# Patient Record
Sex: FEMALE | Race: WHITE
[De-identification: names, ages, dates, MRNs, and addresses within clinical notes are randomized per-mention and may not be internally consistent; named-entity substitution may affect disease eponyms.]

---

## 2017-07-08 ENCOUNTER — HEALTH MAINTENANCE LETTER (OUTPATIENT)
Age: 32
End: 2017-07-08

## 2017-07-29 ENCOUNTER — RADIANT APPOINTMENT (OUTPATIENT)
Dept: GENERAL RADIOLOGY | Facility: CLINIC | Age: 32
End: 2017-07-29
Attending: PHYSICIAN ASSISTANT
Payer: COMMERCIAL

## 2017-07-29 ENCOUNTER — OFFICE VISIT (OUTPATIENT)
Dept: URGENT CARE | Facility: URGENT CARE | Age: 32
End: 2017-07-29
Payer: COMMERCIAL

## 2017-07-29 VITALS
SYSTOLIC BLOOD PRESSURE: 120 MMHG | TEMPERATURE: 98.6 F | RESPIRATION RATE: 12 BRPM | BODY MASS INDEX: 21.57 KG/M2 | HEART RATE: 80 BPM | WEIGHT: 145 LBS | OXYGEN SATURATION: 98 % | DIASTOLIC BLOOD PRESSURE: 84 MMHG

## 2017-07-29 DIAGNOSIS — S69.91XA FINGER INJURY, RIGHT, INITIAL ENCOUNTER: ICD-10-CM

## 2017-07-29 DIAGNOSIS — S62.639B OPEN FRACTURE OF TUFT OF DISTAL PHALANX OF FINGER, INITIAL ENCOUNTER: Primary | ICD-10-CM

## 2017-07-29 PROCEDURE — 73140 X-RAY EXAM OF FINGER(S): CPT | Mod: RT

## 2017-07-29 PROCEDURE — 99214 OFFICE O/P EST MOD 30 MIN: CPT | Performed by: PHYSICIAN ASSISTANT

## 2017-07-29 RX ORDER — HYDROCODONE BITARTRATE AND ACETAMINOPHEN 5; 325 MG/1; MG/1
1-2 TABLET ORAL EVERY 6 HOURS PRN
Qty: 20 TABLET | Refills: 0 | Status: SHIPPED | OUTPATIENT
Start: 2017-07-29

## 2017-07-29 NOTE — NURSING NOTE
"Chief Complaint   Patient presents with     Urgent Care     Finger     smashed ring finger on her right hand about a week ago and has developed what seemed likes a infection now.  Finger is very painful too.     Initial /84 (BP Location: Right arm, Patient Position: Sitting, Cuff Size: Adult Regular)  Pulse 80  Temp 98.6  F (37  C) (Oral)  Resp 12  Wt 145 lb (65.8 kg)  SpO2 98%  BMI 21.57 kg/m2 Estimated body mass index is 21.57 kg/(m^2) as calculated from the following:    Height as of 11/3/16: 5' 8.75\" (1.746 m).    Weight as of this encounter: 145 lb (65.8 kg)..  BP completed using cuff size: regular  Mary Lan R.N.    "

## 2017-07-29 NOTE — MR AVS SNAPSHOT
After Visit Summary   7/29/2017    Ana María Nguyen    MRN: [id number]           Patient Information     Date Of Birth          1985        Visit Information        Provider Department      7/29/2017 12:50 PM Maricruz Yadav PA-C [facility]        Today's Diagnoses     Open fracture of tuft of distal phalanx of finger, initial encounter    -  1    Finger injury, right, initial encounter           Follow-ups after your visit        Who to contact     If you have questions or need follow up information about today's clinic visit or your schedule please contact [facility] directly at [phone].  Normal or non-critical lab and imaging results will be communicated to you by [facility], letter or phone within 4 business days after the clinic has received the results. If you do not hear from us within 7 days, please contact the clinic through [facility] or phone. If you have a critical or abnormal lab result, we will notify you by phone as soon as possible.  Submit refill requests through [employer] or call your pharmacy and they will forward the refill request to us. Please allow 3 business days for your refill to be completed.          Additional Information About Your Visit        MyChart Information     [employer] gives you secure access to your electronic health record. If you see a primary care provider, you can also send messages to your care team and make appointments. If you have questions, please call your primary care clinic.  If you do not have a primary care provider, please call [phone] and they will assist you.        Care EveryWhere ID     This is your Care EveryWhere ID. This could be used by other organizations to access your [facility] [place] medical records  [id number]        Your Vitals Were     Pulse Temperature Respirations Pulse Oximetry BMI (Body Mass Index)       80 98.6  F (37  C) (Oral) 12 98% 21.57 kg/m2        Blood Pressure from Last 3  Encounters:   07/29/17 120/84   11/03/16 120/80   12/26/14 108/70    Weight from Last 3 Encounters:   07/29/17 145 lb (65.8 kg)   11/03/16 149 lb 2 oz (67.6 kg)   12/26/14 147 lb 6.4 oz (66.9 kg)                 Today's Medication Changes          These changes are accurate as of: 7/29/17  7:11 PM.  If you have any questions, ask your nurse or doctor.               Start taking these medicines.        Dose/Directions    amoxicillin-clavulanate 875-125 MG per tablet   Commonly known as:  AUGMENTIN   Used for:  Open fracture of tuft of distal phalanx of finger, initial encounter   Started by:  Maricruz Yadav PA-C        Dose:  1 tablet   Take 1 tablet by mouth 2 times daily for 7 days   Quantity:  14 tablet   Refills:  0       HYDROcodone-acetaminophen 5-325 MG per tablet   Commonly known as:  NORCO   Used for:  Open fracture of tuft of distal phalanx of finger, initial encounter   Started by:  Maricruz Yadav PA-C        Dose:  1-2 tablet   Take 1-2 tablets by mouth every 6 hours as needed for moderate to severe pain   Quantity:  20 tablet   Refills:  0         Stop taking these medicines if you haven't already. Please contact your care team if you have questions.     fluticasone 50 MCG/ACT spray   Commonly known as:  FLONASE   Stopped by:  Maricruz Yadav PA-C                Where to get your medicines      These medications were sent to [id number] - [place] - [address]  AT [address] [place] [address]     Phone:  [phone]     amoxicillin-clavulanate 875-125 MG per tablet         Some of these will need a paper prescription and others can be bought over the counter.  Ask your nurse if you have questions.     Bring a paper prescription for each of these medications     HYDROcodone-acetaminophen 5-325 MG per tablet                Primary Care Provider Office Phone # Fax #    Katie Burrows [phone]  [phone]       [facility] [address] [place] [address]        Equal Access to Services     MANJIT FRANKEL : Hadii linda ramírez kayden Sojohn, wasallyda luqadaha, qaybta kaalmada howradrome, herbert austen jeredcristian lawrenceyarelis kadetegan kendy rowe. So [facility] [phone].    ATENCIÓN: Si habla español, tiene a escalante disposición servicios gratuitos de asistencia lingüística. Llame al [phone].    We comply with applicable federal civil rights laws and [place] laws. We do not discriminate on the basis of race, color, national origin, age, disability sex, sexual orientation or gender identity.            Thank you!     Thank you for choosing [facility]  for your care. Our goal is always to provide you with excellent care. Hearing back from our patients is one way we can continue to improve our services. Please take a few minutes to complete the written survey that you may receive in the mail after your visit with us. Thank you!             Your Updated Medication List - Protect others around you: Learn how to safely use, store and throw away your medicines at www.disposemymeds.org.          This list is accurate as of: 7/29/17  7:11 PM.  Always use your most recent med list.                   Brand Name Dispense Instructions for use Diagnosis    amoxicillin-clavulanate 875-125 MG per tablet    AUGMENTIN    14 tablet    Take 1 tablet by mouth 2 times daily for 7 days    Open fracture of tuft of distal phalanx of finger, initial encounter       HYDROcodone-acetaminophen 5-325 MG per tablet    NORCO    20 tablet    Take 1-2 tablets by mouth every 6 hours as needed for moderate to severe pain    Open fracture of tuft of distal phalanx of finger, initial encounter       ORSYTHIA 0.1-20 MG-MCG per tablet   Generic drug:  levonorgestrel-ethinyl estradiol      Take 1 tablet by mouth daily

## 2017-07-30 NOTE — PROGRESS NOTES
SUBJECTIVE:  Chief Complaint   Patient presents with     Urgent Care     Finger     smashed ring finger on her right hand about a week ago and has developed what seemed likes a infection now.  Finger is very painful too.     Ana María Nguyen is a 31 year old female presents with a chief complaint of right finger  fourth pain, swelling, tenderness and back of nail raised up.  Red around area.  .  The injury occurred 1 week(s) ago.   [place] How: tired to open one of the windows on the boat and it slammed down landing on her finger immediate pain, immediate swelling, was able to bear weight directly after injury, deformity was immediately noted, back of nail seems to be raised up.  .  The patient complained of moderate pain  and has not had decreased ROM.  Pain exacerbated by any pressure on the tip of finger.  Relieved by rest.  She treated it initially with ice and wrapped at times. This is the first time this type of injury has occurred to this patient.     Past Medical History:   Diagnosis Date     Allergic rhinitis, cause unspecified     allergy shots     ASCUS on Pap smear 5/21/10     Current Outpatient Prescriptions   Medication Sig Dispense Refill     amoxicillin-clavulanate (AUGMENTIN) 875-125 MG per tablet Take 1 tablet by mouth 2 times daily for 7 days 14 tablet 0     HYDROcodone-acetaminophen (NORCO) 5-325 MG per tablet Take 1-2 tablets by mouth every 6 hours as needed for moderate to severe pain 20 tablet 0     levonorgestrel-ethinyl estradiol (ORSYTHIA) 0.1-20 MG-MCG per tablet Take 1 tablet by mouth daily       Social History   Substance Use Topics     Smoking status: Never Smoker     Smokeless tobacco: Never Used     Alcohol use Yes      Comment: 3-4drinks at 1-2days per week on [facility]       ROS:  Review of systems negative except as stated above.    EXAM:   /84 (BP Location: Right arm, Patient Position: Sitting, Cuff Size: Adult Regular)  Pulse  80  Temp 98.6  F (37  C) (Oral)  Resp 12  Wt 145 lb (65.8 kg)  SpO2 98%  BMI 21.57 kg/m2  Gen: healthy,alert,no distress  Extremity: finger  fourth has mild swelling over distal finger.  Nail intact but proximal nail at skin level and can slightly lift..  Mild surrounding erythema.  No drainage or abscess noted.  FROM and tendon function intat.   There is not compromise to the distal circulation.  Pulses are +2 and CRT is brisk  GENERAL APPEARANCE: healthy, alert and no distress  EXTREMITIES: peripheral pulses normal  SKIN: no suspicious lesions or rashes.  Skin intact  NEURO: Normal strength and tone, sensory exam grossly normal, mentation intact and speech normal    X-RAY was done. -  Comminuted fracture at tuft of finger.      assessment/plan:  (M44.474C) Open fracture of tuft of distal phalanx of finger, initial encounter  (primary encounter diagnosis)  Comment: right 4th  Plan: amoxicillin-clavulanate (AUGMENTIN) 875-125 MG         per tablet, HYDROcodone-acetaminophen (NORCO)         5-325 MG per tablet         TD current.  Finger cleaned.  Finger splint applied and placed on Augmentin.  FU in 1 week to ensure healing.  Will likely take several weeks to heal fully.  Watch for signs of infection.  Likely to lose nail.   Call with concerns       (B78.71XA) Finger injury, right, initial encounter  Comment:   Plan: XR Finger Right G/E 2 Views

## 2018-07-14 ENCOUNTER — HEALTH MAINTENANCE LETTER (OUTPATIENT)
Age: 33
End: 2018-07-14

## 2019-12-09 ENCOUNTER — HEALTH MAINTENANCE LETTER (OUTPATIENT)
Age: 34
End: 2019-12-09

## 2020-03-15 ENCOUNTER — HEALTH MAINTENANCE LETTER (OUTPATIENT)
Age: 35
End: 2020-03-15

## 2021-01-14 ENCOUNTER — HEALTH MAINTENANCE LETTER (OUTPATIENT)
Age: 36
End: 2021-01-14

## 2021-06-24 ENCOUNTER — WALK IN (OUTPATIENT)
Dept: URGENT CARE | Age: 36
End: 2021-06-24

## 2021-06-24 VITALS
OXYGEN SATURATION: 100 % | BODY MASS INDEX: 22.22 KG/M2 | DIASTOLIC BLOOD PRESSURE: 88 MMHG | WEIGHT: 150 LBS | HEIGHT: 69 IN | HEART RATE: 88 BPM | TEMPERATURE: 97.4 F | SYSTOLIC BLOOD PRESSURE: 132 MMHG

## 2021-06-24 DIAGNOSIS — Z48.02 VISIT FOR SUTURE REMOVAL: Primary | ICD-10-CM

## 2021-06-24 PROCEDURE — 99212 OFFICE O/P EST SF 10 MIN: CPT | Performed by: FAMILY MEDICINE

## 2021-10-24 ENCOUNTER — HEALTH MAINTENANCE LETTER (OUTPATIENT)
Age: 36
End: 2021-10-24

## 2022-02-13 ENCOUNTER — HEALTH MAINTENANCE LETTER (OUTPATIENT)
Age: 37
End: 2022-02-13

## 2022-10-15 ENCOUNTER — HEALTH MAINTENANCE LETTER (OUTPATIENT)
Age: 37
End: 2022-10-15

## 2023-03-26 ENCOUNTER — HEALTH MAINTENANCE LETTER (OUTPATIENT)
Age: 38
End: 2023-03-26